# Patient Record
Sex: FEMALE | Race: WHITE | URBAN - METROPOLITAN AREA
[De-identification: names, ages, dates, MRNs, and addresses within clinical notes are randomized per-mention and may not be internally consistent; named-entity substitution may affect disease eponyms.]

---

## 2019-07-22 ENCOUNTER — OFFICE VISIT (OUTPATIENT)
Dept: FAMILY MEDICINE | Facility: CLINIC | Age: 54
End: 2019-07-22
Payer: COMMERCIAL

## 2019-07-22 VITALS — SYSTOLIC BLOOD PRESSURE: 129 MMHG | TEMPERATURE: 98.3 F | DIASTOLIC BLOOD PRESSURE: 76 MMHG

## 2019-07-22 DIAGNOSIS — Z71.84 TRAVEL ADVICE ENCOUNTER: Primary | ICD-10-CM

## 2019-07-22 PROCEDURE — 86735 MUMPS ANTIBODY: CPT | Performed by: NURSE PRACTITIONER

## 2019-07-22 PROCEDURE — 99000 SPECIMEN HANDLING OFFICE-LAB: CPT | Mod: GA | Performed by: NURSE PRACTITIONER

## 2019-07-22 PROCEDURE — 86762 RUBELLA ANTIBODY: CPT | Performed by: NURSE PRACTITIONER

## 2019-07-22 PROCEDURE — 86765 RUBEOLA ANTIBODY: CPT | Performed by: NURSE PRACTITIONER

## 2019-07-22 PROCEDURE — 86382 NEUTRALIZATION TEST VIRAL: CPT | Mod: 90 | Performed by: NURSE PRACTITIONER

## 2019-07-22 PROCEDURE — 36415 COLL VENOUS BLD VENIPUNCTURE: CPT | Mod: GA | Performed by: NURSE PRACTITIONER

## 2019-07-22 PROCEDURE — 90636 HEP A/HEP B VACC ADULT IM: CPT | Mod: GA | Performed by: NURSE PRACTITIONER

## 2019-07-22 PROCEDURE — 90471 IMMUNIZATION ADMIN: CPT | Mod: GA | Performed by: NURSE PRACTITIONER

## 2019-07-22 PROCEDURE — 99402 PREV MED CNSL INDIV APPRX 30: CPT | Mod: 25 | Performed by: NURSE PRACTITIONER

## 2019-07-22 RX ORDER — ZOLPIDEM TARTRATE 5 MG/1
5 TABLET ORAL
COMMUNITY

## 2019-07-22 RX ORDER — AZITHROMYCIN 500 MG/1
500 TABLET, FILM COATED ORAL DAILY
Qty: 3 TABLET | Refills: 0 | Status: SHIPPED | OUTPATIENT
Start: 2019-07-22 | End: 2019-07-25

## 2019-07-22 RX ORDER — ACETAZOLAMIDE 125 MG/1
TABLET ORAL
Qty: 15 TABLET | Refills: 0 | Status: SHIPPED | OUTPATIENT
Start: 2019-07-22

## 2019-07-22 NOTE — NURSING NOTE
Chief Complaint   Patient presents with     [facility]     initial /84 (BP Location: Right arm, Cuff Size: Adult Regular)   Temp 98.3  F (36.8  C) (Oral)  There is no height or weight on file to calculate BMI.  BP completed using cuff size: regular.  R arm      Health Maintenance that is potentially due pending provider review:  NONE    n/a    Michael Wong ma

## 2019-07-22 NOTE — PROGRESS NOTES
Nurse Note      Itinerary:  [place]      Departure Date: 11/6/19      Return Date: 11/20/19      Length of Trip 2 weeks      Reason for Travel: Tourism    Business           Urban or rural: both      Accommodations: Hotel    Hostel        IMMUNIZATION HISTORY  Have you received any immunizations within the past 4 weeks?  No  Have you ever fainted from having your blood drawn or from an injection?  No  Have you ever had a fever reaction to vaccination?  No  Have you ever had any bad reaction or side effect from any vaccination?  No  Have you ever had hepatitis A or B vaccine?  No  Do you live (or work closely) with anyone who has AIDS, an AIDS-like condition, any other immune disorder or who is on chemotherapy for cancer?  No  Do you have a family history of immunodeficiency?  No  Have you received any injection of immune globulin or any blood products during the past 12 months?  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      Patient itinerary :  will be in the urban region of  [facility] and [facility] which presents risk for food borne illnesses, motor vehicle accidents and Typhoid. exposure.      Patient's activities will include contact with stray dogs for neutering and vaccination program .    Patient's country of birth is [place]    Special medical concerns: none  Pre-travel questionnaire was completed by patient and reviewed by provider.     Vitals: /84 (BP Location: Right arm, Cuff Size: Adult Regular)   Temp 98.3  F (36.8  C) (Oral)   BMI= There is no height or weight on file to calculate BMI.    EXAM:  General:  Well-nourished, well-developed in no acute distress.  Appears to be stated age, interacts appropriately and  expresses understanding of information given to patient.    Current Outpatient Medications   Medication Sig Dispense Refill     zolpidem (AMBIEN) 5 MG tablet Take 5 mg by mouth nightly as needed for sleep       There is no problem list on file for this patient.    Allergies   Allergen Reactions     Codeine Nausea and Vomiting         Immunizations discussed include:   Hepatitis A:  Twin Ranulfo series started today  Hepatitis B: Twin Ranulfo series started today  Influenza: vaccine is not available  Typhoid: Oral Typhoid (Vivotiff) Rx sent to pharmacy  Rabies: Titers drawn  Yellow Fever: Not indicated  Japanese Encephalitis: Not indicated  Meningococcus: Not indicated  Tetanus/Diphtheria: Up to date  Measles/Mumps/Rubella: Titers drawn  Cholera: Not needed  Polio: Up to date  Pneumococcal: Under age of 65  Varicella: Immune by disease history per patient report  Zostavax:  Not indicated  Shingrix: Not indicated  HPV:  Not indicated  TB:  Low risk     Altitude Exposure on this trip: Yes  Past tolerance to Altitude: limited exposure in the past    ASSESSMENT/PLAN:    ICD-10-CM    1. Travel advice encounter Z71.89 Rabies Antibody Endpoint     Rubeola Antibody IgG     Rubella Antibody IgG Quantitative     Mumps Immune Status, IgG     typhoid (VIVOTIF) CR capsule     azithromycin (ZITHROMAX) 500 MG tablet     acetaZOLAMIDE (DIAMOX) 125 MG tablet     Mumps Immune Status, IgG     I have reviewed general recommendations for safe travel   including: food/water precautions, insect precautions, safer sex   practices given high prevalence of Zika, HIV and other STDs,   roadway safety. Educational materials and Travax report provided.    Malaraia prophylaxis recommended: none  Symptomatic treatment for traveler's diarrhea: azithromycin  Altitude illness prevention and treatment: Diamox prescription given with instructions on use and education provided on Acute altitude illness recognition and prevention.        Evacuation insurance  advised and resources were provided to patient.    Total visit time 30 minutes  with over 50% of time spent counseling patient as detailed above.    Kaylen Mo CNP

## 2019-07-22 NOTE — PATIENT INSTRUCTIONS
Today July 22, 2019 you received the    Twinrix (Hepatitis A & B combo) Vaccine - Please return on 8/21/19 for your 2nd dose and 1/18/20 or later for your 3rd and final dose.    Typhoid - Oral. This prescription has been faxed to your pharmacy, please take as directed.   .    These appointments can be made as a NURSE ONLY visit.    **It is very important for the vaccinations to be given on the scheduled day(s), this helps ensure you receive the full effectiveness of the vaccine.**    Please call [facility] with any questions [phone]    Thank you for visiting [facility]

## 2019-07-23 LAB — MUV IGG SER QL IA: 4.5 AI (ref 0–0.8)

## 2019-07-24 ENCOUNTER — TELEPHONE (OUTPATIENT)
Dept: FAMILY MEDICINE | Facility: CLINIC | Age: 54
End: 2019-07-24

## 2019-07-24 NOTE — TELEPHONE ENCOUNTER
Prior Authorization Retail Medication Request    Medication/Dose: typhoid (VIVOTIF) CR capsule  ICD code (if different than what is on RX):    Previously Tried and Failed:    Rationale:      Insurance Name:  [phone]  Insurance ID:  [id number]      Pharmacy Information (if different than what is on RX)  Name:  [facility]  Phone:  [phone]

## 2019-08-05 NOTE — TELEPHONE ENCOUNTER
PRIOR AUTHORIZATION DENIED    Medication: typhoid (VIVOTIF) CR capsule-DENIED    Denial Date: 8/5/2019    Denial Rational: Medication/diagnosis are not a covered benefit and are excluded from coverage.        Appeal Information: N/A

## 2019-08-05 NOTE — TELEPHONE ENCOUNTER
Central Prior Authorization Team   Phone: [phone]      PA Initiation    Medication: typhoid (VIVOTIF) CR capsule-Initiated  Insurance Company: [employer] ([facility]) - Phone [phone] Fax [phone]  Pharmacy Filling the Rx: [id number] [place], [place] - [address] AT [facility]  Filling Pharmacy Phone: [phone]  Filling Pharmacy Fax:    Start Date: 8/5/2019

## 2019-08-08 ENCOUNTER — TELEPHONE (OUTPATIENT)
Dept: FAMILY MEDICINE | Facility: CLINIC | Age: 54
End: 2019-08-08

## 2019-08-08 LAB
MEV IGG SER QL IA: 4.1 AI (ref 0–0.8)
RUBV IGG SERPL IA-ACNC: 8 IU/ML

## 2019-08-08 NOTE — TELEPHONE ENCOUNTER
Peyton,    Patient saw you 7/22 for travel  Asking for lab results.  Rabies still in process.    Thanks,  Roberta Noble RN

## 2019-08-08 NOTE — TELEPHONE ENCOUNTER
Please call patient:    I sent a result note with original titers. Rabies result is still pending which is not unusual .  Thanks  Kaylen Mo CNP (Lori)

## 2019-08-08 NOTE — TELEPHONE ENCOUNTER
Reason for Call:  Other call back    Detailed comments: teast results from bloodwork did travel visit no call back yet    Phone Number Patient can be reached at: Home number on file [phone] (home)    Best Time: any    Can we leave a detailed message on this number? YES    Call taken on 8/8/2019 at 12:58 PM by Destiny Morales

## 2019-08-09 NOTE — TELEPHONE ENCOUNTER
Left detailed message on info below and with lab letter info.  Asked her to call back if questions.  Veronica Peres RN

## 2019-08-22 ENCOUNTER — ALLIED HEALTH/NURSE VISIT (OUTPATIENT)
Dept: NURSING | Facility: CLINIC | Age: 54
End: 2019-08-22
Payer: COMMERCIAL

## 2019-08-22 DIAGNOSIS — Z71.84 TRAVEL ADVICE ENCOUNTER: Primary | ICD-10-CM

## 2019-08-22 PROCEDURE — 90471 IMMUNIZATION ADMIN: CPT | Mod: GA

## 2019-08-22 PROCEDURE — 99207 ZZC NO CHARGE NURSE ONLY: CPT

## 2019-08-22 PROCEDURE — 90636 HEP A/HEP B VACC ADULT IM: CPT | Mod: GA

## 2019-08-26 ENCOUNTER — TELEPHONE (OUTPATIENT)
Dept: FAMILY MEDICINE | Facility: CLINIC | Age: 54
End: 2019-08-26

## 2019-08-27 NOTE — TELEPHONE ENCOUNTER
HUMA reviewed note from , see below. If questions call back to Chioma in travel.    Chioma Richardson RN

## 2019-08-27 NOTE — TELEPHONE ENCOUNTER
----- Message from JACQUELIN Frost CNP sent at 8/22/2019  5:20 PM CDT -----  Regarding: Results  Hi Chioma,    Can you call this patient and tell her that her Rabies titer shows that she has acceptable level of Rabies immunity.  She does not need a booster at this time.      Thank you   Kaylen Mo (Lori) CNP

## 2019-09-19 LAB — LAB SCANNED RESULT: NORMAL

## 2020-08-04 ENCOUNTER — VIRTUAL VISIT (OUTPATIENT)
Dept: FAMILY MEDICINE | Facility: OTHER | Age: 55
End: 2020-08-04

## 2020-08-04 NOTE — PROGRESS NOTES
"Date: 2020 18:12:25  Clinician: Eric Dominguez  Clinician NPI: [id number]  Patient: Shelia Soriano  Patient : 1965  Patient Address: [address] [place] [address] [address], [place] [address]  Patient Phone: [phone]  Visit Protocol: URI  Patient Summary:  Shelia is a 54 year old ( : 1965 ) female who initiated a Visit for COVID-19 (Coronavirus) evaluation and screening. When asked the question \"Please sign me up to receive news, health information and promotions. \", Shelia responded \"No\".    Shelia states her symptoms started today.   Her symptoms consist of myalgia, a sore throat, nasal congestion, rhinitis, malaise, and a headache.   Symptom details     Nasal secretions: The color of her mucus is clear.    Sore throat: Shelia reports having mild throat pain (1-3 on a 10 point pain scale), does not have exudate on her tonsils, and can swallow liquids. The lymph nodes in her neck are not enlarged. A rash has not appeared on the skin since the sore throat started.     Headache: She states the headache is mild (1-3 on a 10 point pain scale).      Shelia denies having wheezing, nausea, teeth pain, ageusia, diarrhea, anosmia, facial pain or pressure, fever, cough, vomiting, ear pain, chills, and enlarged lymph nodes. She also denies having recent facial or sinus surgery in the past 60 days and taking antibiotic medication in the past month. She is not experiencing dyspnea.   Precipitating events  Within the past week, Shelia has not been exposed to someone with strep throat. She has not recently been exposed to someone with influenza. Shelia has been in close contact with the following high risk individuals: adults 65 or older, immunocompromised people, and children under the age of 5.   Pertinent COVID-19 (Coronavirus) information  In the past 14 days, Shelia has not worked in a congregate living setting.   She does not work or volunteer as healthcare worker or a  and does not " work or volunteer in a healthcare facility.   Shelia also has not lived in a congregate living setting in the past 14 days. She does not live with a healthcare worker.   Shelia has not had a close contact with a laboratory-confirmed COVID-19 patient within 14 days of symptom onset.   Pertinent medical history  Shelia typically gets a yeast infection when she takes antibiotics. She has not used fluconazole (Diflucan) to treat previous yeast infections.   Shelia does not need a return to work/school note.   Weight: 160 lbs   Shelia does not smoke or use smokeless tobacco.   Weight: 160 lbs    MEDICATIONS: zolpidem oral, ALLERGIES: NKDA  Clinician Response:  Dear Shelia,   Your symptoms show that you may have coronavirus (COVID-19). This illness can cause fever, cough and trouble breathing. Many people get a mild case and get better on their own. Some people can get very sick.  What should I do?  We would like to test you for this virus.   1. Please call [phone] to schedule your visit. Explain that you were referred by [facility] to have a COVID-19 test. Be ready to share your [facility] visit ID number.  The following will serve as your written order for this COVID Test, ordered by me, for the indication of suspected COVID [Z20.828]: The test will be ordered in [employer], our electronic health record, after you are scheduled. It will show as ordered and authorized by Yasmany Magaña MD.  Order: COVID-19 (Coronavirus) PCR for SYMPTOMATIC testing from [facility].      2. When it's time for your COVID test:  Stay at least 6 feet away from others. (If someone will drive you to your test, stay in the backseat, as far away from the  as you can.)   Cover your mouth and nose with a mask, tissue or washcloth.  Go straight to the testing site. Don't make any stops on the way there or back.      3.Starting now: Stay home and away from others (self-isolate) until:   You've had no fever---and no medicine that reduces fever---for  "one full day (24 hours). And...   Your other symptoms have gotten better. For example, your cough or breathing has improved. And...   At least 10 days have passed since your symptoms started.       During this time, don't leave the house except for testing or medical care.   Stay in your own room, even for meals. Use your own bathroom if you can.   Stay away from others in your home. No hugging, kissing or shaking hands. No visitors.  Don't go to work, school or anywhere else.    Clean \"high touch\" surfaces often (doorknobs, counters, handles, etc.). Use a household cleaning spray or wipes. You'll find a full list of  on the EPA website: www.epa.gov/pesticide-registration/list-n-disinfectants-use-against-sars-cov-2.   Cover your mouth and nose with a mask, tissue or washcloth to avoid spreading germs.  Wash your hands and face often. Use soap and water.  Caregivers in these groups are at risk for severe illness due to COVID-19:  o People 65 years and older  o People who live in a nursing home or long-term care facility  o People with chronic disease (lung, heart, cancer, diabetes, kidney, liver, immunologic)  o People who have a weakened immune system, including those who:   Are in cancer treatment  Take medicine that weakens the immune system, such as corticosteroids  Had a bone marrow or organ transplant  Have an immune deficiency  Have poorly controlled HIV or AIDS  Are obese (body mass index of 40 or higher)  Smoke regularly   o Caregivers should wear gloves while washing dishes, handling laundry and cleaning bedrooms and bathrooms.  o Use caution when washing and drying laundry: Don't shake dirty laundry, and use the warmest water setting that you can.  o For more tips, go to www.cdc.gov/coronavirus/2019-ncov/downloads/10Things.pdf.    4.Sign up for Get[employer] Loop. We know it's scary to hear that you might have COVID-19. We want to track your symptoms to make sure you're okay over the next 2 weeks. Please " look for an email from [employer]---this is a free, online program that we'll use to keep in touch. To sign up, follow the link in the email. Learn more at [email or web address]  How can I take care of myself?   Get lots of rest. Drink extra fluids (unless a doctor has told you not to).   Take Tylenol (acetaminophen) for fever or pain. If you have liver or kidney problems, ask your family doctor if it's okay to take Tylenol.   Adults can take either:    650 mg (two 325 mg pills) every 4 to 6 hours, or...   1,000 mg (two 500 mg pills) every 8 hours as needed.    Note: Don't take more than 3,000 mg in one day. Acetaminophen is found in many medicines (both prescribed and over-the-counter medicines). Read all labels to be sure you don't take too much.   For children, check the Tylenol bottle for the right dose. The dose is based on the child's age or weight.    If you have other health problems (like cancer, heart failure, an organ transplant or severe kidney disease): Call your specialty clinic if you don't feel better in the next 2 days.       Know when to call 911. Emergency warning signs include:    Trouble breathing or shortness of breath Pain or pressure in the chest that doesn't go away Feeling confused like you haven't felt before, or not being able to wake up Bluish-colored lips or face.  Where can I get more information?   [facility] -- About COVID-19: [email or web address]   CDC -- What to Do If You're Sick: www.cdc.gov/coronavirus/2019-ncov/about/steps-when-sick.html   CDC -- Ending Home Isolation: www.cdc.gov/coronavirus/2019-ncov/hcp/disposition-in-home-patients.html   CDC -- Caring for Someone: www.cdc.gov/coronavirus/2019-ncov/if-you-are-sick/care-for-someone.html   [facility] -- Interim Guidance for Hospital Discharge to Home: www.health.[facility].[place]/diseases/coronavirus/hcp/hospdischarge.pdf   [facility] clinical trials (COVID-19 research studies):  clinicalaffairs.[facility]-clinical-trials    Below are the COVID-19 hotlines at the [place] Department of Health ([facility]). Interpreters are available.    For health questions: Call 713-486-6390 or 1-227.332.9613 (7 a.m. to 7 p.m.) For questions about schools and childcare: Call 317-458-4256 or 1-599.280.1882 (7 a.m. to 7 p.m.)    Diagnosis: Other malaise  Diagnosis ICD: R53.81